# Patient Record
Sex: FEMALE | Race: BLACK OR AFRICAN AMERICAN
[De-identification: names, ages, dates, MRNs, and addresses within clinical notes are randomized per-mention and may not be internally consistent; named-entity substitution may affect disease eponyms.]

---

## 2022-03-18 PROBLEM — Z00.00 ENCOUNTER FOR PREVENTIVE HEALTH EXAMINATION: Status: ACTIVE | Noted: 2022-03-18

## 2022-03-21 ENCOUNTER — APPOINTMENT (OUTPATIENT)
Dept: MATERNAL FETAL MEDICINE | Facility: CLINIC | Age: 27
End: 2022-03-21
Payer: OTHER GOVERNMENT

## 2022-03-21 ENCOUNTER — ASOB RESULT (OUTPATIENT)
Age: 27
End: 2022-03-21

## 2022-03-21 DIAGNOSIS — O24.419 GESTATIONAL DIABETES MELLITUS IN PREGNANCY, UNSPECIFIED CONTROL: ICD-10-CM

## 2022-03-21 PROCEDURE — G0109 DIAB MANAGE TRN IND/GROUP: CPT | Mod: 95

## 2022-03-22 RX ORDER — URINE ACETONE TEST STRIPS
STRIP MISCELLANEOUS
Qty: 1 | Refills: 2 | Status: ACTIVE | COMMUNITY
Start: 2022-03-21 | End: 1900-01-01

## 2022-03-22 RX ORDER — LANCETS 33 GAUGE
EACH MISCELLANEOUS
Qty: 2 | Refills: 2 | Status: ACTIVE | COMMUNITY
Start: 2022-03-21 | End: 1900-01-01

## 2022-03-22 RX ORDER — BLOOD-GLUCOSE METER
W/DEVICE KIT MISCELLANEOUS
Qty: 1 | Refills: 0 | Status: ACTIVE | COMMUNITY
Start: 2022-03-21 | End: 1900-01-01

## 2022-03-22 RX ORDER — BLOOD-GLUCOSE METER
KIT MISCELLANEOUS 4 TIMES DAILY
Qty: 4 | Refills: 1 | Status: ACTIVE | COMMUNITY
Start: 2022-03-21 | End: 1900-01-01

## 2022-03-23 ENCOUNTER — NON-APPOINTMENT (OUTPATIENT)
Age: 27
End: 2022-03-23

## 2022-03-25 ENCOUNTER — NON-APPOINTMENT (OUTPATIENT)
Age: 27
End: 2022-03-25

## 2022-03-29 ENCOUNTER — APPOINTMENT (OUTPATIENT)
Dept: MATERNAL FETAL MEDICINE | Facility: CLINIC | Age: 27
End: 2022-03-29

## 2023-07-10 ENCOUNTER — TRANSCRIPTION ENCOUNTER (OUTPATIENT)
Age: 28
End: 2023-07-10

## 2023-07-10 ENCOUNTER — APPOINTMENT (OUTPATIENT)
Dept: CARDIOLOGY | Facility: CLINIC | Age: 28
End: 2023-07-10
Payer: OTHER GOVERNMENT

## 2023-07-10 VITALS
HEIGHT: 62 IN | BODY MASS INDEX: 25.58 KG/M2 | SYSTOLIC BLOOD PRESSURE: 132 MMHG | HEART RATE: 117 BPM | WEIGHT: 139 LBS | DIASTOLIC BLOOD PRESSURE: 90 MMHG | OXYGEN SATURATION: 98 %

## 2023-07-10 DIAGNOSIS — I25.110 ATHEROSCLEROTIC HEART DISEASE OF NATIVE CORONARY ARTERY WITH UNSTABLE ANGINA PECTORIS: ICD-10-CM

## 2023-07-10 PROCEDURE — 93306 TTE W/DOPPLER COMPLETE: CPT

## 2023-07-10 PROCEDURE — 99204 OFFICE O/P NEW MOD 45 MIN: CPT

## 2023-07-10 PROCEDURE — 93000 ELECTROCARDIOGRAM COMPLETE: CPT

## 2023-07-10 RX ORDER — AMLODIPINE BESYLATE 5 MG/1
5 TABLET ORAL
Qty: 90 | Refills: 0 | Status: DISCONTINUED | COMMUNITY
Start: 2023-06-24

## 2023-07-11 NOTE — REVIEW OF SYSTEMS
[Negative] : Heme/Lymph [SOB] : no shortness of breath [Dyspnea on exertion] : not dyspnea during exertion [Chest Discomfort] : no chest discomfort [Lower Ext Edema] : no extremity edema [Leg Claudication] : no intermittent leg claudication [Syncope] : no syncope [Cough] : no cough [FreeTextEntry5] : (+) SOB on Exertion, (+) Palpitations [FreeTextEntry6] : (+) SOB on Exertion [FreeTextEntry2] : (+) Myalgias

## 2023-07-11 NOTE — DISCUSSION/SUMMARY
[EKG obtained to assist in diagnosis and management of assessed problem(s)] : EKG obtained to assist in diagnosis and management of assessed problem(s) [FreeTextEntry1] : EKG performed today revealed Sinus Rhythm, normal QTc pattern, normal intervals, (-) significant ST-T wave changes, as interpreted by me. \par \par Murmur: Recommend an echocardiogram to evaluate for LV function. Echocardiogram performed today revealed global reduced LV function with EF of 40-45%.\par \par SOB on Exertion: Due to exertional nature of symptoms, recommend a stress echocardiogram to evaluate for exercise-induced symptoms. Will need to rule out ischemic cardiomyopathy given familial HLD, current LDL of 204, and brother having atherosclerotic cardiomyopathy. However, patient has longstanding hx of HTN and this may also be hypertensive cardiomyopathy. Will require cardiac MRI to further evaluate for any nonischemic causes of cardiomyopathy. \par \par Recommend a CAC score to risk stratify the patient.\par \par HLD: The impression is hyperlipidemia. Recent lab work revealed LDL of 204, indicating high familial HLD. Patient is currently on Rosuvastatin 10 mg; however, she has significant myalgias in the legs. Recommend a trial of discontinuing Rosuvastatin for 1 week to reassess if symptoms improve. Other planned treatment includes diet modification, exercise, and weight loss.\par \par HTN: The impression is hypertension. Currently, the condition is stable. There are no changes in medication management. Continue current medical therapy. Other planned treatments include an exercise regimen, weight loss, low sodium diet, and alcohol moderation.\par \par Palpitations: Recommend an MCOT Telemetry monitor to evaluate for etiology of palpitations.\par \par Instructed to follow up after testing is complete. \par Plan was discussed with the patient.

## 2023-07-11 NOTE — CARDIOLOGY SUMMARY
[de-identified] : 07/10/2023: SR, normal QTc pattern, normal intervals, (-) significant ST-T wave changes.

## 2023-07-11 NOTE — HISTORY OF PRESENT ILLNESS
[FreeTextEntry1] : NIKI MILES is a 28-year-old female, with a PMHx significant for HTN and HLD, who presents today for cardiac evaluation. Endorses SOB with exertion, which is relieved with rest. 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 She was sent to the ED at [facility] and was discharged home. EKG revealed SR with (+) normal variant T wave inversions in V1-V2, (+) pseudoinfarct pattern in the inferior leads. However, patient has a strong family hx of hypertensive heart disease. She was recently diagnosed with HTN and started on Losartan-HCTZ, from which BPs have been better controlled. Brother passed away from cardiomyopathy at age 21; no further details known. Of note, recent LDL was 204. Patient is currently on Rosuvastatin 10 mg, but complains of significant myalgias in the legs. Additionally, patient endorses palpitations that occur daily. Otherwise: (-) syncope, (-) chest pain, (-) cough, (-) hemoptysis, (-) leg swelling/pain, (-) recent travel, (-) prolonged immobility.

## 2023-07-27 ENCOUNTER — APPOINTMENT (OUTPATIENT)
Dept: MRI IMAGING | Facility: HOSPITAL | Age: 28
End: 2023-07-27

## 2023-07-27 ENCOUNTER — RESULT REVIEW (OUTPATIENT)
Age: 28
End: 2023-07-27

## 2023-07-27 ENCOUNTER — OUTPATIENT (OUTPATIENT)
Dept: OUTPATIENT SERVICES | Facility: HOSPITAL | Age: 28
LOS: 1 days | End: 2023-07-27
Payer: OTHER GOVERNMENT

## 2023-07-27 PROCEDURE — 75561 CARDIAC MRI FOR MORPH W/DYE: CPT | Mod: 26

## 2023-07-27 PROCEDURE — A9577: CPT

## 2023-07-27 PROCEDURE — 75561 CARDIAC MRI FOR MORPH W/DYE: CPT

## 2023-08-07 ENCOUNTER — APPOINTMENT (OUTPATIENT)
Dept: CARDIOLOGY | Facility: CLINIC | Age: 28
End: 2023-08-07
Payer: OTHER GOVERNMENT

## 2023-08-07 PROCEDURE — 93351 STRESS TTE COMPLETE: CPT

## 2023-08-07 PROCEDURE — 93325 DOPPLER ECHO COLOR FLOW MAPG: CPT

## 2023-08-07 PROCEDURE — 93320 DOPPLER ECHO COMPLETE: CPT

## 2023-08-07 PROCEDURE — 99214 OFFICE O/P EST MOD 30 MIN: CPT

## 2023-08-08 ENCOUNTER — APPOINTMENT (OUTPATIENT)
Dept: CARDIOLOGY | Facility: CLINIC | Age: 28
End: 2023-08-08

## 2023-08-09 RX ORDER — ROSUVASTATIN CALCIUM 5 MG/1
5 TABLET, FILM COATED ORAL DAILY
Qty: 90 | Refills: 3 | Status: ACTIVE | COMMUNITY
Start: 2023-08-09 | End: 1900-01-01

## 2023-08-09 NOTE — REVIEW OF SYSTEMS
[SOB] : no shortness of breath [Dyspnea on exertion] : not dyspnea during exertion [Chest Discomfort] : no chest discomfort [Lower Ext Edema] : no extremity edema [Leg Claudication] : no intermittent leg claudication [Syncope] : no syncope [Cough] : no cough [Negative] : Heme/Lymph [FreeTextEntry5] : (+) SOB on Exertion, (+) Palpitations [FreeTextEntry6] : (+) SOB on Exertion [FreeTextEntry2] : (+) Myalgias

## 2023-08-09 NOTE — CARDIOLOGY SUMMARY
[de-identified] : 07/10/2023: SR, normal QTc pattern, normal intervals, (-) significant ST-T wave changes.

## 2023-08-09 NOTE — DISCUSSION/SUMMARY
[FreeTextEntry1] : Stress echo performed today revealed no evidence of exercise-induced myocardial ischemia.  HTN: The impression is hypertension.  BP is mildly elevated.  Recommend starting metoprolol succinate 12.5mg daily in addition to current medical therapy.  Other planned treatments include an exercise regimen, weight loss, low sodium diet, and alcohol moderation.  HLD: The impression is hyperlipidemia. Recent lab work revealed LDL of 204, indicating high familial HLD. Patient is currently on Rosuvastatin 10 mg; however, she has significant myalgias in the legs. Recommend a trial of discontinuing Rosuvastatin for 1 week to reassess if symptoms improve. Other planned treatment includes diet modification, exercise, and weight loss.  Follow up in 3-4 months.   Plan was discussed with the patient.

## 2023-08-09 NOTE — HISTORY OF PRESENT ILLNESS
[FreeTextEntry1] : NIKI MILES is a 28-year-old female, with a PMHx significant for HTN and HLD, who presents today for stress echo to evaluate for possible ischemic etiology of cardiomyopathy.  TTE performed in 7/2023 revealed mildly decreased EF of 45-50%. Subsequent cardiac MRI was negative for LGE.  Patient endorses SOB with exertion, which is relieved with rest.  Occasional palpitations.  Recent MCOT showed NSR with rare PVCs.  CACS is 0.  Home BP log ~ 130-140s/90s.  Otherwise: (-) syncope, (-) chest pain, (-) cough, (-) hemoptysis, (-) leg swelling/pain, (-) recent travel, (-) prolonged immobility.

## 2023-10-16 ENCOUNTER — APPOINTMENT (OUTPATIENT)
Dept: CARDIOLOGY | Facility: CLINIC | Age: 28
End: 2023-10-16
Payer: OTHER GOVERNMENT

## 2023-10-16 VITALS
OXYGEN SATURATION: 100 % | HEIGHT: 62 IN | DIASTOLIC BLOOD PRESSURE: 86 MMHG | BODY MASS INDEX: 23.37 KG/M2 | WEIGHT: 127 LBS | HEART RATE: 103 BPM | SYSTOLIC BLOOD PRESSURE: 120 MMHG | RESPIRATION RATE: 16 BRPM

## 2023-10-16 DIAGNOSIS — E78.00 PURE HYPERCHOLESTEROLEMIA, UNSPECIFIED: ICD-10-CM

## 2023-10-16 DIAGNOSIS — I10 ESSENTIAL (PRIMARY) HYPERTENSION: ICD-10-CM

## 2023-10-16 DIAGNOSIS — I42.9 CARDIOMYOPATHY, UNSPECIFIED: ICD-10-CM

## 2023-10-16 PROCEDURE — 93000 ELECTROCARDIOGRAM COMPLETE: CPT

## 2023-10-16 PROCEDURE — 99214 OFFICE O/P EST MOD 30 MIN: CPT

## 2023-10-16 RX ORDER — ROSUVASTATIN CALCIUM 10 MG/1
10 TABLET, FILM COATED ORAL
Refills: 0 | Status: DISCONTINUED | COMMUNITY
Start: 2023-07-10 | End: 2023-10-16

## 2023-10-16 RX ORDER — HYDROXYZINE PAMOATE 25 MG/1
25 CAPSULE ORAL 4 TIMES DAILY
Refills: 0 | Status: DISCONTINUED | COMMUNITY
Start: 2023-07-10 | End: 2023-10-16

## 2023-10-16 RX ORDER — SERTRALINE 25 MG/1
25 TABLET, FILM COATED ORAL DAILY
Refills: 0 | Status: DISCONTINUED | COMMUNITY
Start: 2023-07-10 | End: 2023-10-16

## 2023-10-16 RX ORDER — ELECTROLYTES/DEXTROSE
SOLUTION, ORAL ORAL DAILY
Refills: 0 | Status: ACTIVE | COMMUNITY
Start: 2023-10-16

## 2024-02-06 ENCOUNTER — NON-APPOINTMENT (OUTPATIENT)
Age: 29
End: 2024-02-06

## 2024-02-06 RX ORDER — METOPROLOL SUCCINATE 25 MG/1
25 TABLET, EXTENDED RELEASE ORAL
Qty: 45 | Refills: 3 | Status: DISCONTINUED | COMMUNITY
Start: 2023-08-07 | End: 2024-02-06

## 2024-02-06 RX ORDER — LOSARTAN POTASSIUM AND HYDROCHLOROTHIAZIDE 12.5; 5 MG/1; MG/1
50-12.5 TABLET ORAL DAILY
Qty: 90 | Refills: 3 | Status: DISCONTINUED | COMMUNITY
Start: 2023-07-10 | End: 2024-02-06

## 2024-02-28 RX ORDER — LABETALOL HYDROCHLORIDE 300 MG/1
300 TABLET, FILM COATED ORAL
Qty: 180 | Refills: 3 | Status: DISCONTINUED | COMMUNITY
Start: 2024-02-06 | End: 2024-02-28

## 2024-02-29 RX ORDER — NIFEDIPINE 30 MG/1
30 TABLET, EXTENDED RELEASE ORAL DAILY
Qty: 90 | Refills: 0 | Status: ACTIVE | COMMUNITY
Start: 2024-02-28 | End: 1900-01-01

## 2024-04-15 ENCOUNTER — APPOINTMENT (OUTPATIENT)
Dept: CARDIOLOGY | Facility: CLINIC | Age: 29
End: 2024-04-15